# Patient Record
Sex: FEMALE | Race: WHITE | NOT HISPANIC OR LATINO | URBAN - METROPOLITAN AREA
[De-identification: names, ages, dates, MRNs, and addresses within clinical notes are randomized per-mention and may not be internally consistent; named-entity substitution may affect disease eponyms.]

---

## 2017-11-28 ENCOUNTER — THERAPY VISIT (OUTPATIENT)
Dept: PHYSICAL THERAPY | Facility: CLINIC | Age: 52
End: 2017-11-28
Payer: COMMERCIAL

## 2017-11-28 DIAGNOSIS — M25.511 ACUTE PAIN OF RIGHT SHOULDER: Primary | ICD-10-CM

## 2017-11-28 PROCEDURE — 97161 PT EVAL LOW COMPLEX 20 MIN: CPT | Mod: GP | Performed by: PHYSICAL THERAPIST

## 2017-11-28 PROCEDURE — 97112 NEUROMUSCULAR REEDUCATION: CPT | Mod: GP | Performed by: PHYSICAL THERAPIST

## 2017-11-28 PROCEDURE — 97110 THERAPEUTIC EXERCISES: CPT | Mod: GP | Performed by: PHYSICAL THERAPIST

## 2017-11-28 NOTE — LETTER
[facility]  [address]  [place] [address]  [phone]    2017    Re: Bren Prado   :   1965  MRN:  [id number]   REFERRING PHYSICIAN:   [facility]    Date of Initial Evaluation:  17  Visits:  Rxs Used: 1  Reason for Referral:  Acute pain of right shoulder    [facility] Initial Evaluation    Subjective:    HPI Comments: LEONARDO 43/100   Patient is a 52 year old female presenting with rehab right shoulder hpi. The history is provided by the patient. No  was used.   Bren Prado is a 52 year old female with a right shoulder condition.  Condition occurred with:  Repetition/overuse and degenerative joint disease.  Condition occurred: for unknown reasons.  This is a new condition  17.    Patient reports pain:  Anterior and lateral.  Radiates to:  Upper arm.  Pain is described as stabbing and aching and is intermittent and reported as 6/10.  Associated symptoms:  Loss of motion/stiffness, painful arc and loss of strength. Pain is worse during the night.  Symptoms are exacerbated by using arm behind back, using arm overhead, using arm at shoulder level, lifting, lying on extremity and carrying and relieved by rest and ice.  Since onset symptoms are gradually worsening.    Previous treatment includes physical therapy and surgery.  There was significant improvement following previous treatment.  General health as reported by patient is excellent.  Pertinent medical history includes:  Thyroid problems and menopausal.  Medical allergies: no.  Other surgeries include:  Orthopedic surgery.  Current medications:  Thyroid medication.  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  Primary job tasks include:  Prolonged standing, lifting, prolonged sitting and repetitive tasks.    Barriers include:  None as reported by the patient.    Red flags:  None as  reported by the patient.    Objective:    Standing Alignment:    Cervical/Thoracic:  Forward head  Shoulder/UE:  Rounded shoulders, scapular winging R and depressed scapula R    Gait:    Gait Type:  Normal   Weight Bearing Status:  WBAT     Flexibility/Screens:   Negative screens: Cervical   Neurological: She is alert. She has normal strength and normal reflexes. No cranial nerve deficit or sensory deficit.     Shoulder Evaluation:  ROM:  AROM:    Extension/Internal Rotation:  Left:  T7    Right:  T12      PROM:    Flexion:  Left:  180    Right: 170    Internal Rotation:  Left:  90    Right:  90  External Rotation:  Left:  90    Right:  55    Strength:  : weak and painful ER, abduction and prominant scap wing /dyskinesia .     Special Tests:    Right shoulder positive for the following special tests:Impingement    Palpation:  not assessed    Assessment/Plan:    Patient is a 52 year old female with left side shoulder complaints.    Patient has the following significant findings with corresponding treatment plan.                Diagnosis 1:  R Shoulder Pain, scap duskinesia  Pain -  hot/cold therapy, self management, education, directional preference exercise and home program  Decreased ROM/flexibility - manual therapy and therapeutic exercise  Decreased strength - therapeutic exercise and therapeutic activities    Therapy Evaluation Codes:   1) History comprised of:   Personal factors that impact the plan of care:      Past/current experiences and Time since onset of symptoms.    Comorbidity factors that impact the plan of care are:      RCR 2012.     Medications impacting care: thyroid medication .  2) Examination of Body Systems comprised of:   Body structures and functions that impact the plan of care:      Shoulder.   Activity limitations that impact the plan of care are:      Dressing, Lifting, Reading/Computer work and Sleeping.  3) Clinical presentation characteristics  are:   Stable/Uncomplicated.  4) Decision-Making    Low complexity using standardized patient assessment instrument and/or measureable assessment of functional outcome.    Cumulative Therapy Evaluation is: Low complexity.  Previous and current functional limitations:  (See Goal Flow Sheet for this information)    Short term and Long term goals: (See Goal Flow Sheet for this information)   Communication ability:  Patient appears to be able to clearly communicate and understand verbal and written communication and follow directions correctly.  Treatment Explanation - The following has been discussed with the patient:   RX ordered/plan of care  Anticipated outcomes  Possible risks and side effects  This patient would benefit from PT intervention to resume normal activities.   Rehab potential is good.    Frequency:  1 X week, every 2-4 weeks  once daily  Duration:  for 12 weeks  Discharge Plan:  Achieve all LTG.  Independent in home treatment program.  Reach maximal therapeutic benefit.    Rehab Plans: gentle re-instruction to scap stability exercises and gradual return to RTC strength as directed     Thank you for your referral.    INQUIRIES  Therapist: Chad Medina PT  [facility]  [address] [place] [address]  Phone: [phone]  Fax: [phone]

## 2017-11-29 PROBLEM — M25.511 ACUTE PAIN OF RIGHT SHOULDER: Status: ACTIVE | Noted: 2017-11-29

## 2017-11-29 NOTE — PROGRESS NOTES
[facility] Initial Evaluation      Subjective:    HPI Comments: LEONARDO 43/100     Patient is a 52 year old female presenting with rehab right shoulder hpi. The history is provided by the patient. No  was used.   Bren Prado is a 52 year old female with a right shoulder condition.  Condition occurred with:  Repetition/overuse and degenerative joint disease.  Condition occurred: for unknown reasons.  This is a new condition  8/28/17.    Patient reports pain:  Anterior and lateral.  Radiates to:  Upper arm.  Pain is described as stabbing and aching and is intermittent and reported as 6/10.  Associated symptoms:  Loss of motion/stiffness, painful arc and loss of strength. Pain is worse during the night.  Symptoms are exacerbated by using arm behind back, using arm overhead, using arm at shoulder level, lifting, lying on extremity and carrying and relieved by rest and ice.  Since onset symptoms are gradually worsening.    Previous treatment includes physical therapy and surgery.  There was significant improvement following previous treatment.  General health as reported by patient is excellent.  Pertinent medical history includes:  Thyroid problems and menopausal.  Medical allergies: no.  Other surgeries include:  Orthopedic surgery.  Current medications:  Thyroid medication.  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  Primary job tasks include:  Prolonged standing, lifting, prolonged sitting and repetitive tasks.    Barriers include:  None as reported by the patient.    Red flags:  None as reported by the patient.                        Objective:    Standing Alignment:    Cervical/Thoracic:  Forward head  Shoulder/UE:  Rounded shoulders, scapular winging R and depressed scapula R              Gait:    Gait Type:  Normal   Weight Bearing Status:  WBAT         Flexibility/Screens:   Negative screens: Cervical            Neurological: She is alert. She has normal strength and normal reflexes. No cranial nerve deficit or sensory deficit.                      Shoulder Evaluation:  ROM:  AROM:                          Extension/Internal Rotation:  Left:  T7    Right:  T12    PROM:    Flexion:  Left:  180    Right: 170          Internal Rotation:  Left:  90    Right:  90  External Rotation:  Left:  90    Right:  55                    Strength:  : weak and painful ER, abduction and prominant scap wing /dyskinesia .                         Special Tests:      Right shoulder positive for the following special tests:Impingement  Palpation:  not assessed                                         General     ROS    Assessment/Plan:      Patient is a 52 year old female with left side shoulder complaints.    Patient has the following significant findings with corresponding treatment plan.                Diagnosis 1:  R Shoulder Pain, scap duskinesia  Pain -  hot/cold therapy, self management, education, directional preference exercise and home program  Decreased ROM/flexibility - manual therapy and therapeutic exercise  Decreased strength - therapeutic exercise and therapeutic activities    Therapy Evaluation Codes:   1) History comprised of:   Personal factors that impact the plan of care:      Past/current experiences and Time since onset of symptoms.    Comorbidity factors that impact the plan of care are:      RCR 2012.     Medications impacting care: thyroid medication .  2) Examination of Body Systems comprised of:   Body structures and functions that impact the plan of care:      Shoulder.   Activity limitations that impact the plan of care are:      Dressing, Lifting, Reading/Computer work and Sleeping.  3) Clinical presentation characteristics are:   Stable/Uncomplicated.  4) Decision-Making    Low complexity using standardized patient assessment instrument and/or measureable assessment of functional outcome.  Cumulative  Therapy Evaluation is: Low complexity.    Previous and current functional limitations:  (See Goal Flow Sheet for this information)    Short term and Long term goals: (See Goal Flow Sheet for this information)     Communication ability:  Patient appears to be able to clearly communicate and understand verbal and written communication and follow directions correctly.  Treatment Explanation - The following has been discussed with the patient:   RX ordered/plan of care  Anticipated outcomes  Possible risks and side effects  This patient would benefit from PT intervention to resume normal activities.   Rehab potential is good.    Frequency:  1 X week, every 2-4 weeks  once daily  Duration:  for 12 weeks  Discharge Plan:  Achieve all LTG.  Independent in home treatment program.  Reach maximal therapeutic benefit.    Rehab Plans: gentle re-instruction to scap stability exercises and gradual return to RTC strength as directed     Please refer to the daily flowsheet for treatment today, total treatment time and time spent performing 1:1 timed codes.

## 2018-03-17 PROBLEM — M25.511 ACUTE PAIN OF RIGHT SHOULDER: Status: RESOLVED | Noted: 2017-11-29 | Resolved: 2018-03-17
